# Patient Record
Sex: FEMALE | Race: WHITE | NOT HISPANIC OR LATINO | URBAN - METROPOLITAN AREA
[De-identification: names, ages, dates, MRNs, and addresses within clinical notes are randomized per-mention and may not be internally consistent; named-entity substitution may affect disease eponyms.]

---

## 2022-09-08 ENCOUNTER — OFFICE VISIT (OUTPATIENT)
Dept: OBSTETRICS AND GYNECOLOGY | Facility: CLINIC | Age: 47
End: 2022-09-08
Payer: COMMERCIAL

## 2022-09-08 VITALS
WEIGHT: 170 LBS | DIASTOLIC BLOOD PRESSURE: 68 MMHG | HEART RATE: 81 BPM | SYSTOLIC BLOOD PRESSURE: 105 MMHG | BODY MASS INDEX: 25.76 KG/M2 | HEIGHT: 68 IN

## 2022-09-08 DIAGNOSIS — Z12.4 CERVICAL CANCER SCREENING: ICD-10-CM

## 2022-09-08 DIAGNOSIS — Z01.419 WOMEN'S ANNUAL ROUTINE GYNECOLOGICAL EXAMINATION: Primary | ICD-10-CM

## 2022-09-08 DIAGNOSIS — N93.9 ABNORMAL UTERINE BLEEDING (AUB): ICD-10-CM

## 2022-09-08 DIAGNOSIS — Z98.51 H/O TUBAL LIGATION: ICD-10-CM

## 2022-09-08 DIAGNOSIS — Z12.31 ENCOUNTER FOR MAMMOGRAM TO ESTABLISH BASELINE MAMMOGRAM: ICD-10-CM

## 2022-09-08 PROCEDURE — 3074F PR MOST RECENT SYSTOLIC BLOOD PRESSURE < 130 MM HG: ICD-10-PCS | Mod: CPTII,S$GLB,, | Performed by: STUDENT IN AN ORGANIZED HEALTH CARE EDUCATION/TRAINING PROGRAM

## 2022-09-08 PROCEDURE — 3078F DIAST BP <80 MM HG: CPT | Mod: CPTII,S$GLB,, | Performed by: STUDENT IN AN ORGANIZED HEALTH CARE EDUCATION/TRAINING PROGRAM

## 2022-09-08 PROCEDURE — 3008F PR BODY MASS INDEX (BMI) DOCUMENTED: ICD-10-PCS | Mod: CPTII,S$GLB,, | Performed by: STUDENT IN AN ORGANIZED HEALTH CARE EDUCATION/TRAINING PROGRAM

## 2022-09-08 PROCEDURE — 3008F BODY MASS INDEX DOCD: CPT | Mod: CPTII,S$GLB,, | Performed by: STUDENT IN AN ORGANIZED HEALTH CARE EDUCATION/TRAINING PROGRAM

## 2022-09-08 PROCEDURE — 3074F SYST BP LT 130 MM HG: CPT | Mod: CPTII,S$GLB,, | Performed by: STUDENT IN AN ORGANIZED HEALTH CARE EDUCATION/TRAINING PROGRAM

## 2022-09-08 PROCEDURE — 99386 PREV VISIT NEW AGE 40-64: CPT | Mod: S$GLB,,, | Performed by: STUDENT IN AN ORGANIZED HEALTH CARE EDUCATION/TRAINING PROGRAM

## 2022-09-08 PROCEDURE — 3078F PR MOST RECENT DIASTOLIC BLOOD PRESSURE < 80 MM HG: ICD-10-PCS | Mod: CPTII,S$GLB,, | Performed by: STUDENT IN AN ORGANIZED HEALTH CARE EDUCATION/TRAINING PROGRAM

## 2022-09-08 PROCEDURE — 1159F MED LIST DOCD IN RCRD: CPT | Mod: CPTII,S$GLB,, | Performed by: STUDENT IN AN ORGANIZED HEALTH CARE EDUCATION/TRAINING PROGRAM

## 2022-09-08 PROCEDURE — 99386 PR PREVENTIVE VISIT,NEW,40-64: ICD-10-PCS | Mod: S$GLB,,, | Performed by: STUDENT IN AN ORGANIZED HEALTH CARE EDUCATION/TRAINING PROGRAM

## 2022-09-08 PROCEDURE — 1160F RVW MEDS BY RX/DR IN RCRD: CPT | Mod: CPTII,S$GLB,, | Performed by: STUDENT IN AN ORGANIZED HEALTH CARE EDUCATION/TRAINING PROGRAM

## 2022-09-08 PROCEDURE — 1160F PR REVIEW ALL MEDS BY PRESCRIBER/CLIN PHARMACIST DOCUMENTED: ICD-10-PCS | Mod: CPTII,S$GLB,, | Performed by: STUDENT IN AN ORGANIZED HEALTH CARE EDUCATION/TRAINING PROGRAM

## 2022-09-08 PROCEDURE — 1159F PR MEDICATION LIST DOCUMENTED IN MEDICAL RECORD: ICD-10-PCS | Mod: CPTII,S$GLB,, | Performed by: STUDENT IN AN ORGANIZED HEALTH CARE EDUCATION/TRAINING PROGRAM

## 2022-09-08 RX ORDER — ALBUTEROL SULFATE 90 UG/1
2 AEROSOL, METERED RESPIRATORY (INHALATION) EVERY 4 HOURS PRN
COMMUNITY
Start: 2022-07-22

## 2022-09-08 RX ORDER — ONDANSETRON 4 MG/1
TABLET, FILM COATED ORAL
COMMUNITY
Start: 2022-08-06

## 2022-09-08 RX ORDER — GABAPENTIN 300 MG/1
CAPSULE ORAL
COMMUNITY
Start: 2022-09-01

## 2022-09-08 RX ORDER — BUPROPION HYDROCHLORIDE 200 MG/1
TABLET, EXTENDED RELEASE ORAL
COMMUNITY
Start: 2022-09-02

## 2022-09-08 RX ORDER — CLONIDINE HYDROCHLORIDE 0.1 MG/1
TABLET ORAL
COMMUNITY
Start: 2022-08-06

## 2022-09-08 RX ORDER — TIZANIDINE 4 MG/1
TABLET ORAL
COMMUNITY
Start: 2022-08-06

## 2022-09-08 RX ORDER — PRAZOSIN HYDROCHLORIDE 1 MG/1
CAPSULE ORAL
COMMUNITY
Start: 2022-08-29

## 2022-09-08 RX ORDER — BUPROPION HYDROCHLORIDE 150 MG/1
TABLET ORAL
COMMUNITY
Start: 2022-08-11

## 2022-09-08 RX ORDER — NAPROXEN 500 MG/1
500 TABLET ORAL 2 TIMES DAILY PRN
COMMUNITY
Start: 2022-07-22

## 2022-09-08 RX ORDER — TRAZODONE HYDROCHLORIDE 100 MG/1
TABLET ORAL
COMMUNITY
Start: 2022-09-01

## 2022-09-08 RX ORDER — DICYCLOMINE HYDROCHLORIDE 20 MG/1
TABLET ORAL
COMMUNITY
Start: 2022-08-06

## 2022-09-08 NOTE — PROGRESS NOTES
Chief Complaint: Annual Exam    HPI: 47 y.o.  here for Annual Exam.  She reports her cycles are still monthly however very greatly by the days.  She reports she may have bleeding for 1 day verses 7-10 days.  She reports her bleeding during this time is heavy.  She has no other complaints today    Review of Systems   Constitutional:  Negative for chills and fever.   HENT:  Negative for congestion and sore throat.    Respiratory:  Negative for cough and shortness of breath.    Cardiovascular:  Negative for chest pain and palpitations.   Gastrointestinal:  Negative for abdominal pain, nausea and vomiting.   Genitourinary:  Negative for dysuria, frequency and urgency.   Musculoskeletal:  Negative for back pain.   Skin:  Negative for itching and rash.   Neurological:  Negative for headaches.   All other systems reviewed and are negative.    Past Medical History asthma, drug use (meth - s/p rehab)  Past Surgical History BTL  Past OB History:   Past Gyn History: Denies prior abnormal pap smears, denies STD's, menstrual history as above.   Contraception History: tubal ligation  Social History: + tob use, denies d/e, previous drug use - meth  Family History denies breast, colon, ovarian, or uterine cancer  Allergies: NKDA  Current Outpatient Medications   Medication Instructions    albuterol (PROVENTIL/VENTOLIN HFA) 90 mcg/actuation inhaler 2 puffs, Inhalation, Every 4 hours PRN    buPROPion (WELLBUTRIN SR) 200 MG SR12 No dose, route, or frequency recorded.    buPROPion (WELLBUTRIN XL) 150 MG TB24 tablet No dose, route, or frequency recorded.    cloNIDine (CATAPRES) 0.1 MG tablet take 1 tablet by mouth every 4 hours UNTIL FURTHER NOTICE if needed    dicyclomine (BENTYL) 20 mg tablet take 1 tablet by mouth every 6 hours UNTIL FURTHER NOTICE if needed    gabapentin (NEURONTIN) 300 MG capsule No dose, route, or frequency recorded.    naproxen (NAPROSYN) 500 mg, Oral, 2 times daily PRN    ondansetron (ZOFRAN) 4 MG tablet  take 1 tablet by mouth every 4 hours UNTIL FURTHER NOTICE if needed    prazosin (MINIPRESS) 1 MG Cap No dose, route, or frequency recorded.    tiZANidine (ZANAFLEX) 4 MG tablet take 1 tablet by mouth every 8 hours UNTIL FURTHER NOTICE if needed    traZODone (DESYREL) 100 MG tablet No dose, route, or frequency recorded.     Physical Exam:  Vitals:    09/08/22 1001   BP: 105/68   Pulse: 81     Body mass index is 25.85 kg/m².  Physical Exam  Vitals reviewed. Exam conducted with a chaperone present.   Constitutional:       General: She is not in acute distress.     Appearance: Normal appearance.   HENT:      Head: Normocephalic and atraumatic.   Eyes:      Extraocular Movements: Extraocular movements intact.      Pupils: Pupils are equal, round, and reactive to light.   Pulmonary:      Effort: Pulmonary effort is normal. No respiratory distress.   Chest:   Breasts:     Breasts are symmetrical.      Right: No inverted nipple, mass, nipple discharge, skin change or tenderness.      Left: No inverted nipple, mass, nipple discharge, skin change or tenderness.   Abdominal:      General: There is no distension.      Palpations: Abdomen is soft.      Tenderness: There is no abdominal tenderness.   Genitourinary:     Comments: Normal external female genitalia, no masses or lesions, vagina pink with rugated, no vaginal bleeding or discharge, not friable cervix  Bimanual exam:  No cervical motion tenderness, years.  Small mobile, no adnexal masses or tenderness  Musculoskeletal:         General: No swelling or tenderness. Normal range of motion.      Cervical back: Normal range of motion and neck supple.   Lymphadenopathy:      Upper Body:      Right upper body: No supraclavicular or axillary adenopathy.      Left upper body: No supraclavicular or axillary adenopathy.   Skin:     General: Skin is warm and dry.   Neurological:      General: No focal deficit present.      Mental Status: She is alert and oriented to person, place,  and time.        ASSESSMENT:   Patient is a 47 y.o.  who presents for annual exam     Patient Active Problem List   Diagnosis    Abnormal uterine bleeding (AUB)    H/O tubal ligation       PLAN:  Pap today  Mammogram - ordered  Lipid profile, Diabetes screening followed with PCP  Counseling on self-breast exams, diet, and exercise.  Counseled on different treatment options for her abnormal uterine bleeding including medical versus surgical options, patient to think about it   Patient to return to clinic in 1 year for annual or sooner if needed

## 2022-09-12 PROBLEM — A59.9 TRICHOMONAS VAGINALIS INFECTION: Status: ACTIVE | Noted: 2022-09-12

## 2022-09-13 LAB — Lab: NORMAL
